# Patient Record
Sex: FEMALE | Race: WHITE | NOT HISPANIC OR LATINO | URBAN - NONMETROPOLITAN AREA
[De-identification: names, ages, dates, MRNs, and addresses within clinical notes are randomized per-mention and may not be internally consistent; named-entity substitution may affect disease eponyms.]

---

## 2019-06-21 ENCOUNTER — IMPORTED ENCOUNTER (OUTPATIENT)
Dept: URBAN - NONMETROPOLITAN AREA CLINIC 1 | Facility: CLINIC | Age: 19
End: 2019-06-21

## 2019-06-21 PROCEDURE — 92014 COMPRE OPH EXAM EST PT 1/>: CPT

## 2019-06-21 PROCEDURE — 92015 DETERMINE REFRACTIVE STATE: CPT

## 2019-06-21 NOTE — PATIENT DISCUSSION
*Myopia & Astigmatism:.-	  Discussed refractive status with patient in detail. *Gls RX:.-	  A new spectacle prescription was created and dispensed today. *no cl tx today

## 2020-09-03 ENCOUNTER — IMPORTED ENCOUNTER (OUTPATIENT)
Dept: URBAN - NONMETROPOLITAN AREA CLINIC 1 | Facility: CLINIC | Age: 20
End: 2020-09-03

## 2020-09-03 PROCEDURE — 92014 COMPRE OPH EXAM EST PT 1/>: CPT

## 2020-09-03 PROCEDURE — 92015 DETERMINE REFRACTIVE STATE: CPT

## 2021-10-05 ENCOUNTER — IMPORTED ENCOUNTER (OUTPATIENT)
Dept: URBAN - NONMETROPOLITAN AREA CLINIC 1 | Facility: CLINIC | Age: 21
End: 2021-10-05

## 2021-10-05 PROCEDURE — 92015 DETERMINE REFRACTIVE STATE: CPT

## 2021-10-05 PROCEDURE — 92014 COMPRE OPH EXAM EST PT 1/>: CPT

## 2022-02-14 NOTE — PATIENT DISCUSSION
Tried increasing the plus in her MFs last year and she asked me to switch it back.  On paper her distance rx is more plus than she's using in the MFs but she didn't like the change in the real world last year.  She wants to try to build in some more help at near again this year so we're not changing the rx as much in an attempt to try to make the change easier to absorb.  OK to order if satisfied but I told her we can switch it back again if she decides the previous rx is the better one.

## 2022-04-15 ASSESSMENT — VISUAL ACUITY
OS_SC: 20/25
OS_SC: 20/25
OU_SC: 20/25
OS_CC: J1
OD_SC: 20/20
OD_SC: 20/25
OD_SC: 20/30
OU_CC: 20/20
OS_SC: 20/20
OD_CC: J1
OS_CC: 20/20
OD_CC: 20/20

## 2022-04-15 ASSESSMENT — TONOMETRY
OS_IOP_MMHG: 14
OD_IOP_MMHG: 14
OS_IOP_MMHG: 17
OS_IOP_MMHG: 18
OD_IOP_MMHG: 18
OD_IOP_MMHG: 17

## 2022-08-25 NOTE — PATIENT DISCUSSION
CONSIDER SURGERY: Visually and functionally significant. Discussed the r/b/a of surgery and pt wishes to wait at this time.

## 2022-12-12 ENCOUNTER — ESTABLISHED PATIENT (OUTPATIENT)
Dept: RURAL CLINIC 1 | Facility: CLINIC | Age: 22
End: 2022-12-12

## 2022-12-12 PROCEDURE — 92310-E CONTACT LENS FITTING ESTABLISH PATIENT

## 2022-12-12 PROCEDURE — 92015 DETERMINE REFRACTIVE STATE: CPT

## 2022-12-12 PROCEDURE — 92014 COMPRE OPH EXAM EST PT 1/>: CPT

## 2022-12-12 ASSESSMENT — TONOMETRY
OD_IOP_MMHG: 16
OS_IOP_MMHG: 16

## 2022-12-12 ASSESSMENT — VISUAL ACUITY
OS_CC: 20/20
OS_CC: 20/20
OD_CC: 20/25
OU_CC: 20/20
OU_CC: 20/20
OD_CC: 20/20

## 2024-10-07 ENCOUNTER — COMPREHENSIVE EXAM (OUTPATIENT)
Dept: RURAL CLINIC 1 | Facility: CLINIC | Age: 24
End: 2024-10-07

## 2024-10-07 DIAGNOSIS — H52.13: ICD-10-CM

## 2024-10-07 DIAGNOSIS — H52.223: ICD-10-CM

## 2024-10-07 PROCEDURE — 92310 CONTACT LENS FITTING OU: CPT

## 2024-10-07 PROCEDURE — 92015 DETERMINE REFRACTIVE STATE: CPT

## 2024-10-07 PROCEDURE — 92014 COMPRE OPH EXAM EST PT 1/>: CPT
